# Patient Record
Sex: MALE | Race: WHITE | NOT HISPANIC OR LATINO | Employment: FULL TIME | URBAN - METROPOLITAN AREA
[De-identification: names, ages, dates, MRNs, and addresses within clinical notes are randomized per-mention and may not be internally consistent; named-entity substitution may affect disease eponyms.]

---

## 2021-01-17 ENCOUNTER — OFFICE VISIT (OUTPATIENT)
Dept: URGENT CARE | Facility: PHYSICIAN GROUP | Age: 36
End: 2021-01-17
Payer: OTHER MISCELLANEOUS

## 2021-01-17 VITALS
DIASTOLIC BLOOD PRESSURE: 90 MMHG | RESPIRATION RATE: 16 BRPM | WEIGHT: 185 LBS | OXYGEN SATURATION: 98 % | TEMPERATURE: 98.4 F | BODY MASS INDEX: 27.4 KG/M2 | HEART RATE: 86 BPM | SYSTOLIC BLOOD PRESSURE: 140 MMHG | HEIGHT: 69 IN

## 2021-01-17 DIAGNOSIS — H61.23 BILATERAL IMPACTED CERUMEN: ICD-10-CM

## 2021-01-17 DIAGNOSIS — H93.8X3 EAR FULLNESS, BILATERAL: ICD-10-CM

## 2021-01-17 PROCEDURE — 99202 OFFICE O/P NEW SF 15 MIN: CPT | Performed by: PHYSICIAN ASSISTANT

## 2021-01-17 ASSESSMENT — ENCOUNTER SYMPTOMS
SORE THROAT: 0
FEVER: 0
VOMITING: 0
TINGLING: 0
NAUSEA: 0
MYALGIAS: 0
WHEEZING: 0
CHILLS: 0
SHORTNESS OF BREATH: 0
SENSORY CHANGE: 0
FOCAL WEAKNESS: 0
COUGH: 0
VERTIGO: 0
DIZZINESS: 0

## 2021-01-17 NOTE — PROGRESS NOTES
"Subjective:      Basil Puri is a 35 y.o. male who presents with Ear Fullness (R ear fullness x1 wk)            Ear Fullness  This is a recurrent problem. Episode onset: 1 week- recent episode. Bilateral ear fullness. The problem occurs constantly. The problem has been unchanged. Pertinent negatives include no chills, congestion, coughing, fever, myalgias, nausea, sore throat, vertigo or vomiting. Associated symptoms comments: Muffled hearing, ear congestion. Nothing aggravates the symptoms. Treatments tried: Hydrogen peroxide flushing  The treatment provided no relief.       Past Medical History:   Diagnosis Date   • GERD (gastroesophageal reflux disease)        No past surgical history on file.    No family history on file.    No Known Allergies    Medications, Allergies, and current problem list reviewed today in Epic    Review of Systems   Constitutional: Negative for chills, fever and malaise/fatigue.   HENT: Positive for hearing loss. Negative for congestion, ear discharge, ear pain and sore throat.         Muffled hearing bilaterally    Respiratory: Negative for cough, shortness of breath and wheezing.    Gastrointestinal: Negative for nausea and vomiting.   Musculoskeletal: Negative for myalgias.   Neurological: Negative for dizziness, vertigo, tingling, sensory change and focal weakness.     All other systems reviewed and are negative.          Objective:     /90 (BP Location: Right arm, Patient Position: Sitting, BP Cuff Size: Small adult)   Pulse 86   Temp 36.9 °C (98.4 °F) (Temporal)   Resp 16   Ht 1.753 m (5' 9\")   Wt 83.9 kg (185 lb)   SpO2 98%   BMI 27.32 kg/m²      Physical Exam  Constitutional:       General: He is not in acute distress.     Appearance: He is not ill-appearing.   HENT:      Head: Normocephalic and atraumatic.      Right Ear: There is impacted cerumen.      Left Ear: There is impacted cerumen.      Ears:      Comments: After lavage- ear canals without edema or " erythema. TM's intact without erythema. Symptoms resolved.  Eyes:      Conjunctiva/sclera: Conjunctivae normal.   Cardiovascular:      Rate and Rhythm: Normal rate.   Pulmonary:      Effort: Pulmonary effort is normal. No respiratory distress.   Skin:     General: Skin is warm and dry.      Findings: No rash.   Neurological:      General: No focal deficit present.      Mental Status: He is alert and oriented to person, place, and time.   Psychiatric:         Mood and Affect: Mood normal.         Behavior: Behavior normal.         Thought Content: Thought content normal.         Judgment: Judgment normal.                 Assessment/Plan:        1. Ear fullness, bilateral    2. Bilateral impacted cerumen    Ear lavage   No signs of otitis media or externa bilaterally.    Differential diagnoses, Supportive care, and indications for immediate follow-up discussed with patient.   Pathogenesis of diagnosis discussed including typical length and natural progression.   Instructed to return to clinic or nearest emergency department for any change in condition, further concerns, or worsening of symptoms.    The patient demonstrated a good understanding and agreed with the treatment plan.    Janette Johnson P.A.-C.

## 2025-04-14 ENCOUNTER — OFFICE VISIT (OUTPATIENT)
Dept: URGENT CARE | Facility: PHYSICIAN GROUP | Age: 40
End: 2025-04-14
Payer: COMMERCIAL

## 2025-04-14 ENCOUNTER — HOSPITAL ENCOUNTER (OUTPATIENT)
Dept: RADIOLOGY | Facility: MEDICAL CENTER | Age: 40
End: 2025-04-14
Attending: NURSE PRACTITIONER
Payer: COMMERCIAL

## 2025-04-14 VITALS
OXYGEN SATURATION: 98 % | RESPIRATION RATE: 16 BRPM | TEMPERATURE: 98.6 F | SYSTOLIC BLOOD PRESSURE: 134 MMHG | HEART RATE: 83 BPM | WEIGHT: 189 LBS | DIASTOLIC BLOOD PRESSURE: 64 MMHG | HEIGHT: 69 IN | BODY MASS INDEX: 27.99 KG/M2

## 2025-04-14 DIAGNOSIS — S99.912A INJURY OF LEFT ANKLE, INITIAL ENCOUNTER: ICD-10-CM
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
## 2025-04-14 DIAGNOSIS — S90.02XA CONTUSION OF LEFT ANKLE, INITIAL ENCOUNTER: Primary | ICD-10-CM

## 2025-04-14 PROCEDURE — 73610 X-RAY EXAM OF ANKLE: CPT | Mod: LT

## 2025-04-14 PROCEDURE — 3078F DIAST BP <80 MM HG: CPT | Performed by: NURSE PRACTITIONER

## 2025-04-14 PROCEDURE — 3075F SYST BP GE 130 - 139MM HG: CPT | Performed by: NURSE PRACTITIONER

## 2025-04-14 PROCEDURE — 99203 OFFICE O/P NEW LOW 30 MIN: CPT | Performed by: NURSE PRACTITIONER

## 2025-04-15 NOTE — PROGRESS NOTES
"Basil Puri is a 39 y.o. male who presents for Ankle Injury (LT ankle injury, pt was playing softball when the ball hit his LT ankle yesterday. Did not have any pain initially but he noticed swelling when he woke up today. Took advil. )      HPI  This is a new problem. Basil Puri is a 39 y.o. patient who presents to urgent care with c/o: Injury occurred yesterday when he was playing baseball.  A ball hit his left outer ankle.  He was fine in [place] and then had a long car trip back home.  Today has increased swelling and tenderness.  He took Advil and used ice packs last night.  He is concerned that he may have a fractured ankle.  He can bear weight on it and walk but is difficult when he is walking or moving his ankle.  He denies numbness, tingling.  No previous injury to his ankle    ROS See HPI    Allergies:     No Known Allergies    PMSFS Hx:  Past Medical History:   Diagnosis Date    GERD (gastroesophageal reflux disease)      History reviewed. No pertinent surgical history.  History reviewed. No pertinent family history.  Social History     Tobacco Use    Smoking status: Former    Smokeless tobacco: Current     Types: Chew   Substance Use Topics    Alcohol use: Yes         Problems:   There is no problem list on file for this patient.      Medications:   Current Outpatient Medications on File Prior to Visit   Medication Sig Dispense Refill    pantoprazole (PROTONIX) 40 MG TBEC Take 40 mg by mouth every day. (Patient not taking: Reported on 4/14/2025)      ondansetron (ZOFRAN ODT) 4 MG TBDP Take 4 mg by mouth every 8 hours as needed. (Patient not taking: Reported on 4/14/2025)       No current facility-administered medications on file prior to visit.        Objective:     /64 (BP Location: Right arm, Patient Position: Sitting, BP Cuff Size: Adult long)   Pulse 83   Temp 37 °C (98.6 °F) (Temporal)   Resp 16   Ht 1.753 m (5' 9\") Comment: Pt reported  Wt 85.7 kg (189 lb)   SpO2 98%   " BMI 27.91 kg/m²     Physical Exam  Vitals and nursing note reviewed.   Constitutional:       Appearance: Normal appearance. He is well-developed and well-groomed.   Cardiovascular:      Rate and Rhythm: Normal rate and regular rhythm.      Pulses: Normal pulses.   Pulmonary:      Effort: Pulmonary effort is normal.   Musculoskeletal:      Left ankle: Swelling present. No deformity, ecchymosis or lacerations. Tenderness (Generalized) present. Decreased range of motion.      Left Achilles Tendon: Normal.   Skin:     General: Skin is warm and dry.      Capillary Refill: Capillary refill takes less than 2 seconds.   Neurological:      Mental Status: He is alert and oriented to person, place, and time.      Deep Tendon Reflexes: Reflexes are normal and symmetric.   Psychiatric:         Speech: Speech normal.         Behavior: Behavior is cooperative.     Xray was reviewed and interpreted independently by me.     RADIOLOGY RESULTS   DX-ANKLE 3+ VIEWS LEFT  Result Date: 4/14/2025 4/14/2025 6:09 PM HISTORY/REASON FOR EXAM:  Pain/Deformity Following Trauma. TECHNIQUE/EXAM DESCRIPTION AND NUMBER OF VIEWS:  3 views of the LEFT ankle. COMPARISON: None. FINDINGS: Bones: Normal bone mineralization. No fracture. No focal bone lesion. Joints: Intact. No subluxation or joint effusion. Soft tissues: Left ankle soft tissue swelling.     1. Left ankle soft tissue swelling. 2. No fracture, subluxation or joint effusion.             Assessment /Associated Orders:      1. Contusion of left ankle, initial encounter        2. Injury of left ankle, initial encounter  DX-ANKLE 3+ VIEWS LEFT      3. Injury while playing baseball  DX-ANKLE 3+ VIEWS LEFT              Medical Decision Making:    Basil Puri is a very pleasant 39 y.o. male who is clinically stable at today's acute urgent care visit. Presents with acute problem/ concern today.    No acute distress is noted at the time of the visit.  VSS. Appropriate for outpatient care at  this time.     ACE wrap prn pain/ swelling   Activity as tolerated  PRICE therapy:       P- protect from further injury      R- rest the affected area as much as possible while pain and swelling persist      I- Ice packs - 15 minutes every 2 hours for the first 24 hours, then 4-5 times daily      C- compress either with splint or ace wrap as directed     E-elevate the extremity to help with swelling and pain    Through shared decision making a discussion of the Dx and DDx, management options (risks,benefits, and alternatives to planned treatment), natural progression, supportive care and indications for immediate follow-up discussed. Expressed understanding and the treatment plan was agreed upon.    Questions were encouraged and answered     Follow Up:   Return to urgent care prn if new or worsening sx or if there is no improvement in condition prn.    Educated in Red flags and indications to immediately call 911 or present to the Emergency Department.           Please note that this dictation was created using voice recognition software. I have worked with consultants from the vendor as well as technical experts from [facility] [employer] to optimize the interface. I have made every reasonable attempt to correct obvious errors, but I expect that there are errors of grammar and possibly content that I did not discover before finalizing the note.  This note was electronically signed by provider